# Patient Record
Sex: MALE | Race: WHITE | NOT HISPANIC OR LATINO | Employment: OTHER | URBAN - METROPOLITAN AREA
[De-identification: names, ages, dates, MRNs, and addresses within clinical notes are randomized per-mention and may not be internally consistent; named-entity substitution may affect disease eponyms.]

---

## 2023-08-07 ENCOUNTER — HOSPITAL ENCOUNTER (EMERGENCY)
Facility: HOSPITAL | Age: 58
Discharge: HOME | End: 2023-08-07
Attending: EMERGENCY MEDICINE

## 2023-08-07 ENCOUNTER — APPOINTMENT (OUTPATIENT)
Dept: CT IMAGING | Facility: HOSPITAL | Age: 58
End: 2023-08-07

## 2023-08-07 ENCOUNTER — APPOINTMENT (OUTPATIENT)
Dept: GENERAL RADIOLOGY | Facility: HOSPITAL | Age: 58
End: 2023-08-07

## 2023-08-07 VITALS
SYSTOLIC BLOOD PRESSURE: 161 MMHG | TEMPERATURE: 98 F | HEIGHT: 69 IN | BODY MASS INDEX: 28.14 KG/M2 | HEART RATE: 49 BPM | OXYGEN SATURATION: 98 % | RESPIRATION RATE: 17 BRPM | WEIGHT: 190 LBS | DIASTOLIC BLOOD PRESSURE: 77 MMHG

## 2023-08-07 DIAGNOSIS — R07.89 CHEST WALL PAIN: ICD-10-CM

## 2023-08-07 DIAGNOSIS — R07.89 ATYPICAL CHEST PAIN: Primary | ICD-10-CM

## 2023-08-07 DIAGNOSIS — R03.0 ELEVATED BLOOD PRESSURE READING WITHOUT DIAGNOSIS OF HYPERTENSION: ICD-10-CM

## 2023-08-07 LAB
ALBUMIN SERPL-MCNC: 4.8 G/DL (ref 3.5–5.2)
ALBUMIN/GLOB SERPL: 2.1 G/DL
ALP SERPL-CCNC: 71 U/L (ref 39–117)
ALT SERPL W P-5'-P-CCNC: 21 U/L (ref 1–41)
ANION GAP SERPL CALCULATED.3IONS-SCNC: 9.1 MMOL/L (ref 5–15)
AST SERPL-CCNC: 17 U/L (ref 1–40)
BASOPHILS # BLD AUTO: 0.05 10*3/MM3 (ref 0–0.2)
BASOPHILS NFR BLD AUTO: 0.7 % (ref 0–1.5)
BILIRUB SERPL-MCNC: 1.2 MG/DL (ref 0–1.2)
BUN SERPL-MCNC: 10 MG/DL (ref 6–20)
BUN/CREAT SERPL: 12.8 (ref 7–25)
CALCIUM SPEC-SCNC: 9.6 MG/DL (ref 8.6–10.5)
CHLORIDE SERPL-SCNC: 101 MMOL/L (ref 98–107)
CO2 SERPL-SCNC: 26.9 MMOL/L (ref 22–29)
CREAT SERPL-MCNC: 0.78 MG/DL (ref 0.76–1.27)
DEPRECATED RDW RBC AUTO: 42.2 FL (ref 37–54)
EGFRCR SERPLBLD CKD-EPI 2021: 104 ML/MIN/1.73
EOSINOPHIL # BLD AUTO: 0.19 10*3/MM3 (ref 0–0.4)
EOSINOPHIL NFR BLD AUTO: 2.6 % (ref 0.3–6.2)
ERYTHROCYTE [DISTWIDTH] IN BLOOD BY AUTOMATED COUNT: 12.3 % (ref 12.3–15.4)
GEN 5 2HR TROPONIN T REFLEX: <6 NG/L
GLOBULIN UR ELPH-MCNC: 2.3 GM/DL
GLUCOSE SERPL-MCNC: 169 MG/DL (ref 65–99)
HCT VFR BLD AUTO: 42.7 % (ref 37.5–51)
HGB BLD-MCNC: 14.5 G/DL (ref 13–17.7)
HOLD SPECIMEN: NORMAL
HOLD SPECIMEN: NORMAL
IMM GRANULOCYTES # BLD AUTO: 0.02 10*3/MM3 (ref 0–0.05)
IMM GRANULOCYTES NFR BLD AUTO: 0.3 % (ref 0–0.5)
LIPASE SERPL-CCNC: 27 U/L (ref 13–60)
LYMPHOCYTES # BLD AUTO: 1.98 10*3/MM3 (ref 0.7–3.1)
LYMPHOCYTES NFR BLD AUTO: 27.3 % (ref 19.6–45.3)
MCH RBC QN AUTO: 31.3 PG (ref 26.6–33)
MCHC RBC AUTO-ENTMCNC: 34 G/DL (ref 31.5–35.7)
MCV RBC AUTO: 92 FL (ref 79–97)
MONOCYTES # BLD AUTO: 0.61 10*3/MM3 (ref 0.1–0.9)
MONOCYTES NFR BLD AUTO: 8.4 % (ref 5–12)
NEUTROPHILS NFR BLD AUTO: 4.4 10*3/MM3 (ref 1.7–7)
NEUTROPHILS NFR BLD AUTO: 60.7 % (ref 42.7–76)
NT-PROBNP SERPL-MCNC: 281.6 PG/ML (ref 0–900)
PLATELET # BLD AUTO: 255 10*3/MM3 (ref 140–450)
PMV BLD AUTO: 10.3 FL (ref 6–12)
POTASSIUM SERPL-SCNC: 3.7 MMOL/L (ref 3.5–5.2)
PROT SERPL-MCNC: 7.1 G/DL (ref 6–8.5)
QT INTERVAL: 436 MS
RBC # BLD AUTO: 4.64 10*6/MM3 (ref 4.14–5.8)
SODIUM SERPL-SCNC: 137 MMOL/L (ref 136–145)
TROPONIN T DELTA: NORMAL
TROPONIN T SERPL HS-MCNC: 6 NG/L
WBC NRBC COR # BLD: 7.25 10*3/MM3 (ref 3.4–10.8)
WHOLE BLOOD HOLD COAG: NORMAL
WHOLE BLOOD HOLD SPECIMEN: NORMAL

## 2023-08-07 PROCEDURE — 85025 COMPLETE CBC W/AUTO DIFF WBC: CPT | Performed by: NURSE PRACTITIONER

## 2023-08-07 PROCEDURE — 80053 COMPREHEN METABOLIC PANEL: CPT | Performed by: NURSE PRACTITIONER

## 2023-08-07 PROCEDURE — 84484 ASSAY OF TROPONIN QUANT: CPT | Performed by: NURSE PRACTITIONER

## 2023-08-07 PROCEDURE — 93005 ELECTROCARDIOGRAM TRACING: CPT | Performed by: EMERGENCY MEDICINE

## 2023-08-07 PROCEDURE — 99285 EMERGENCY DEPT VISIT HI MDM: CPT

## 2023-08-07 PROCEDURE — 71045 X-RAY EXAM CHEST 1 VIEW: CPT

## 2023-08-07 PROCEDURE — 83880 ASSAY OF NATRIURETIC PEPTIDE: CPT | Performed by: NURSE PRACTITIONER

## 2023-08-07 PROCEDURE — 83690 ASSAY OF LIPASE: CPT | Performed by: NURSE PRACTITIONER

## 2023-08-07 PROCEDURE — 25510000001 IOPAMIDOL PER 1 ML: Performed by: EMERGENCY MEDICINE

## 2023-08-07 PROCEDURE — 71275 CT ANGIOGRAPHY CHEST: CPT

## 2023-08-07 PROCEDURE — 36415 COLL VENOUS BLD VENIPUNCTURE: CPT

## 2023-08-07 RX ORDER — SODIUM CHLORIDE 0.9 % (FLUSH) 0.9 %
10 SYRINGE (ML) INJECTION AS NEEDED
Status: DISCONTINUED | OUTPATIENT
Start: 2023-08-07 | End: 2023-08-07 | Stop reason: HOSPADM

## 2023-08-07 RX ORDER — ASPIRIN 81 MG/1
324 TABLET, CHEWABLE ORAL ONCE
Status: COMPLETED | OUTPATIENT
Start: 2023-08-07 | End: 2023-08-07

## 2023-08-07 RX ORDER — ALUMINA, MAGNESIA, AND SIMETHICONE 2400; 2400; 240 MG/30ML; MG/30ML; MG/30ML
30 SUSPENSION ORAL ONCE
Status: COMPLETED | OUTPATIENT
Start: 2023-08-07 | End: 2023-08-07

## 2023-08-07 RX ADMIN — ALUMINUM HYDROXIDE, MAGNESIUM HYDROXIDE, AND DIMETHICONE 30 ML: 400; 400; 40 SUSPENSION ORAL at 10:16

## 2023-08-07 RX ADMIN — ASPIRIN 81 MG CHEWABLE TABLET 324 MG: 81 TABLET CHEWABLE at 09:25

## 2023-08-07 RX ADMIN — IOPAMIDOL 100 ML: 755 INJECTION, SOLUTION INTRAVENOUS at 11:12

## 2023-08-07 NOTE — [facility] PROVIDER NOTE
EMERGENCY DEPARTMENT ENCOUNTER    Room Number:  04/04  Date seen:  8/7/2023  Time seen: 09:11 EDT  PCP: Provider, No Known  Historian: patient    HPI:  Chief complaint:chest pain  A complete HPI/ROS/PMH/PSH/SH/FH are unobtainable due to: n/a  Context:Noe Angela is a 58 y.o. male who presents to the ED with c/o acute chest pain which started on Friday early am, then dissipated while working and then chest pain returned in evening and has been constant ever since. The pain is right sided chest wall and extends beneath his arm into right axilla.  He denies any associated shortness of breath, diaphoresis, n/v.  He has had intermittent episodes of chest pain in past.  States at one time he had chest pain and passed out while driving leading to heart cath in 2015.  He has not seen a heart doctor since then.      Social determinants of health which may impact assessment: Not applicable    Review of prior external notes (non-ED): I reviewed patient's recent primary care office visit from April 6 of this year.    Review of prior external test results outside of this encounter: It is documented patient had a heart cath in 2015 with mild blockage only.  I do not have interpretations of this test     ALLERGIES  Patient has no known allergies.    PAST MEDICAL HISTORY  Active Ambulatory Problems     Diagnosis Date Noted    No Active Ambulatory Problems     Resolved Ambulatory Problems     Diagnosis Date Noted    No Resolved Ambulatory Problems     Past Medical History:   Diagnosis Date    Arthritis        PAST SURGICAL HISTORY  History reviewed. No pertinent surgical history.    FAMILY HISTORY  History reviewed. No pertinent family history.    SOCIAL HISTORY  Social History     Socioeconomic History    Marital status: Single   Vaping Use    Vaping Use: Some days   Substance and Sexual Activity    Alcohol use: Not Currently    Drug use: Yes     Types: Marijuana       REVIEW OF SYSTEMS  Review of Systems    All systems  reviewed and negative except for those discussed in HPI.     PHYSICAL EXAM    I have reviewed the triage vital signs and nursing notes.  Vitals:    08/07/23 1001   BP: 157/76   Pulse:    Resp:    Temp:    SpO2: 96%     Physical Exam    GENERAL: not distressed, pleasant and cooperative  HENT: nares patent  EYES: no scleral icterus  NECK: no ROM limitations  CV: regular rhythm, bradycardia, no murmur  RESPIRATORY: normal effort, clear to auscultate bilaterally no wheezing or stridor  ABDOMEN: soft  : deferred  MUSCULOSKELETAL: no deformity  NEURO: alert, moves all extremities, follows commands  SKIN: warm, dry    HEARTSCORE    History  Highly suspicious              2    Moderately suspicious             1    Slightly or non-suspicious             0    ECG  Significant ST depression              2    Nonspecific repol disturbance            1    Normal                           0    Age  > or = 65                          2     46-65                           1    < or = 45                          0    Risk factors (hypercholesterolemia, HTN, DM, smoking, pos fam hx, obesity)                            > or = to 3 RF for atherosclerotic dx   2    1 or 2                 1    No risk factors                0    Troponin > or = 3x normal limit               2    1-3x normal limit    1    < or = Normal limit    0    Score  0 - 3 is low risk (0.9-1.7% risk of adverse cardiac event)  Score  4 - 6 is moderate risk (12-16.6% risk of adverse cardiac event)  Score  6 - 9 is high risk (50-65% risk of adverse cardiac event)    This patient's HEART score is 3       LAB RESULTS  Recent Results (from the past 24 hour(s))   ECG 12 Lead Chest Pain    Collection Time: 08/07/23  9:14 AM   Result Value Ref Range    QT Interval 436 ms   Comprehensive Metabolic Panel    Collection Time: 08/07/23  9:19 AM    Specimen: Blood   Result Value Ref Range    Glucose 169 (H) 65 - 99 mg/dL    BUN 10 6 - 20 mg/dL    Creatinine 0.78 0.76 - 1.27  mg/dL    Sodium 137 136 - 145 mmol/L    Potassium 3.7 3.5 - 5.2 mmol/L    Chloride 101 98 - 107 mmol/L    CO2 26.9 22.0 - 29.0 mmol/L    Calcium 9.6 8.6 - 10.5 mg/dL    Total Protein 7.1 6.0 - 8.5 g/dL    Albumin 4.8 3.5 - 5.2 g/dL    ALT (SGPT) 21 1 - 41 U/L    AST (SGOT) 17 1 - 40 U/L    Alkaline Phosphatase 71 39 - 117 U/L    Total Bilirubin 1.2 0.0 - 1.2 mg/dL    Globulin 2.3 gm/dL    A/G Ratio 2.1 g/dL    BUN/Creatinine Ratio 12.8 7.0 - 25.0    Anion Gap 9.1 5.0 - 15.0 mmol/L    eGFR 104.0 >60.0 mL/min/1.73   BNP    Collection Time: 08/07/23  9:19 AM    Specimen: Blood   Result Value Ref Range    proBNP 281.6 0.0 - 900.0 pg/mL   Lipase    Collection Time: 08/07/23  9:19 AM    Specimen: Blood   Result Value Ref Range    Lipase 27 13 - 60 U/L   High Sensitivity Troponin T    Collection Time: 08/07/23  9:19 AM    Specimen: Blood   Result Value Ref Range    HS Troponin T 6 <15 ng/L   CBC Auto Differential    Collection Time: 08/07/23  9:19 AM    Specimen: Blood   Result Value Ref Range    WBC 7.25 3.40 - 10.80 10*3/mm3    RBC 4.64 4.14 - 5.80 10*6/mm3    Hemoglobin 14.5 13.0 - 17.7 g/dL    Hematocrit 42.7 37.5 - 51.0 %    MCV 92.0 79.0 - 97.0 fL    MCH 31.3 26.6 - 33.0 pg    MCHC 34.0 31.5 - 35.7 g/dL    RDW 12.3 12.3 - 15.4 %    RDW-SD 42.2 37.0 - 54.0 fl    MPV 10.3 6.0 - 12.0 fL    Platelets 255 140 - 450 10*3/mm3    Neutrophil % 60.7 42.7 - 76.0 %    Lymphocyte % 27.3 19.6 - 45.3 %    Monocyte % 8.4 5.0 - 12.0 %    Eosinophil % 2.6 0.3 - 6.2 %    Basophil % 0.7 0.0 - 1.5 %    Immature Grans % 0.3 0.0 - 0.5 %    Neutrophils, Absolute 4.40 1.70 - 7.00 10*3/mm3    Lymphocytes, Absolute 1.98 0.70 - 3.10 10*3/mm3    Monocytes, Absolute 0.61 0.10 - 0.90 10*3/mm3    Eosinophils, Absolute 0.19 0.00 - 0.40 10*3/mm3    Basophils, Absolute 0.05 0.00 - 0.20 10*3/mm3    Immature Grans, Absolute 0.02 0.00 - 0.05 10*3/mm3   Green Top (Gel)    Collection Time: 08/07/23  9:19 AM   Result Value Ref Range    Extra Tube Hold for  add-ons.    Lavender Top    Collection Time: 08/07/23  9:19 AM   Result Value Ref Range    Extra Tube hold for add-on    Gold Top - SST    Collection Time: 08/07/23  9:19 AM   Result Value Ref Range    Extra Tube Hold for add-ons.    Light Blue Top    Collection Time: 08/07/23  9:19 AM   Result Value Ref Range    Extra Tube Hold for add-ons.    High Sensitivity Troponin T 2Hr    Collection Time: 08/07/23 11:24 AM    Specimen: Blood   Result Value Ref Range    HS Troponin T <6 <15 ng/L    Troponin T Delta         Ordered the above labs and independently interpreted results.  My findings will be discussed in the ED course or medical decision making section below    RADIOLOGY RESULTS  XR Chest 1 View    Result Date: 8/7/2023  XR CHEST 1 VW Date of Exam: 8/7/2023 9:27 AM EDT Indication: acute chest pain Comparison: None available. Findings: Lung fields appear normally inflated and clear of infiltrates or effusions. Heart and pulmonary vessels appear within normal limits.     Impression: No acute process. Electronically Signed: Teetee Patel MD  8/7/2023 9:40 AM EDT  Workstation ID: [id number]    CT Angiogram Chest Pulmonary Embolism    Result Date: 8/7/2023  CT ANGIOGRAM CHEST PULMONARY EMBOLISM Date of Exam: 8/7/2023 11:00 AM EDT Indication: Pulmonary embolism (PE) suspected, unknown D-dimer., Chest pain Comparison: None available. Technique: Axial CT images were obtained of the chest after the uneventful intravenous administration of iodinated contrast utilizing pulmonary embolism protocol.  Sagittal and coronal reconstructions were performed.  Automated exposure control and iterative reconstruction methods were used. Findings: There are no filling defects suspicious for pulmonary emboli. There are no enlarged mediastinal nodes. There are no hilar masses. There are no pericardial or pleural effusions. There is some mild atelectasis in the lung bases. There are no noncalcified pulmonary nodules or masses.      Impression: Negative exam for pulmonary embolism. Mild bibasilar atelectasis. Electronically Signed: Teetee Patel MD  8/7/2023 11:17 AM EDT  Workstation ID: [id number]      Ordered the above noted radiological studies.  Independently interpreted by me.  My findings will be discussed in the medical decision section below.     PROGRESS, DATA ANALYSIS, CONSULTS AND MEDICAL DECISION MAKING    Please note that this section constitutes my independent interpretation of clinical data including lab results, radiology, EKG's.  This constitutes my independent professional opinion regarding differential diagnosis and management of this patient.  It may include any factors such as history from outside sources, review of external records, social determinants of health, management of medications, response to those treatments, and discussions with other providers.      ED Course as of 08/07/23 1201   Mon Aug 07, 2023   0916 EKG          EKG time: 0914  Rhythm/Rate: 56 sinus bradycardia  P waves and ND: Normal ND, normal DAILY  QRS, axis: normal QRS and axis   ST and T waves: no acute ST /T wave abnormalities    Interpreted Contemporaneously by me, independently viewed  No prior available for comparision   [EW]   0957 I viewed chest x-ray in PACS.  My independent interpretation is no acute infiltrate. [EW]   1127 CTA negative for acute PE.  High-sensitivity troponin, #2 is in lab. [EW]      ED Course User Index  [EW] Karen Tesfaye APRN       Orders placed during this visit:  Orders Placed This Encounter   Procedures    XR Chest 1 View    CT Angiogram Chest Pulmonary Embolism    Comprehensive Metabolic Panel    BNP    Lipase    High Sensitivity Troponin T    [facility] Draw    CBC Auto Differential    High Sensitivity Troponin T 2Hr    Pulse Oximetry Continuous    ECG 12 Lead Chest Pain    Blood Draw With IV Start    Insert peripheral IV    CBC & Differential    Green Top (Gel)    Lavender Top    Gold Top - SST    Light Blue Top     ED Acknowledgement Form Needed;              Medical Decision Making  Problems Addressed:  Atypical chest pain: complicated acute illness or injury  Chest wall pain: complicated acute illness or injury  Elevated blood pressure reading without diagnosis of hypertension: complicated acute illness or injury    Amount and/or Complexity of Data Reviewed  Labs: ordered.  Radiology: ordered.  ECG/medicine tests: ordered.    Risk  OTC drugs.  Prescription drug management.    Exam without evidence of volume overload so doubt heart failure. EKG without signs of active ischemia. Presentation not consistent with acute PE (CTA negative),pneumothorax (not visualized on chest xr), thoracic aortic dissection, pericarditis, tamponade, pneumonia (no infectious symptoms, clear chest xr), myocarditis (no recent illness, neg trop). HEART score:3 so plan to patient follow-up as an outpatient    DIAGNOSIS  Final diagnoses:   Atypical chest pain   Elevated blood pressure reading without diagnosis of hypertension   Chest wall pain          Medication List      No changes were made to your prescriptions during this visit.         FOLLOW-UP  Alexander Abel MD  [address] [address]  [phone]    Schedule an appointment as soon as possible for a visit           Latest Documented Vital Signs:  As of 12:01 EDT  BP- 157/76 HR- 63 Temp- 98 øF (36.7 øC) O2 sat- 96%    Appropriate PPE utilized throughout this patient encounter to include mask, if indicated, per current protocol. Hand hygiene was performed before donning PPE and after removal when leaving the room.    Please note that portions of this were completed with a voice recognition program.     Note Disclaimer: At [facility], we believe that sharing information builds trust and better relationships. You are receiving this note because you are receiving care at [facility] or recently visited. It is possible you will see health information before a provider  has talked with you about it. This kind of information can be easy to misunderstand. To help you fully understand what it means for your health, we urge you to discuss this note with your provider.

## 2023-08-07 NOTE — DISCHARGE INSTRUCTIONS
Cardiac testing negative today    CT chest negative for any abnormal blood clot, called a Pulmonary Embolism    I recommend outpatient follow up with Cardiology    Please keep eye on your blood pressure    Return Precautions    Although you are being discharged from the ED today, I encourage you to return for worsening symptoms.  Things can, and do, change such that treatment at home with medication may not be adequate.      Specifically, return for any of the following:    Chest pain, shortness of breath, pain or nausea and vomiting not controlled by medications provided.    Please make a follow up with your Primary Care Provider for a blood pressure recheck.